# Patient Record
Sex: FEMALE | Race: WHITE | Employment: FULL TIME | URBAN - METROPOLITAN AREA
[De-identification: names, ages, dates, MRNs, and addresses within clinical notes are randomized per-mention and may not be internally consistent; named-entity substitution may affect disease eponyms.]

---

## 2017-02-08 PROBLEM — J34.89 NASAL SEPTAL PERFORATION: Status: ACTIVE | Noted: 2017-02-08

## 2017-02-08 PROBLEM — J32.0 CHRONIC MAXILLARY SINUSITIS: Status: ACTIVE | Noted: 2017-02-08

## 2017-02-08 PROBLEM — R49.0 HOARSENESS: Status: ACTIVE | Noted: 2017-02-08

## 2017-02-08 PROBLEM — R09.82 POST-NASAL DRIP: Status: ACTIVE | Noted: 2017-02-08

## 2017-04-05 ENCOUNTER — HOSPITAL ENCOUNTER (OUTPATIENT)
Dept: ULTRASOUND IMAGING | Facility: HOSPITAL | Age: 37
Discharge: HOME | End: 2017-04-05
Attending: OTOLARYNGOLOGY
Payer: MEDICAID

## 2017-04-05 DIAGNOSIS — E04.1 THYROID NODULE: ICD-10-CM

## 2017-04-05 PROCEDURE — 76942 ECHO GUIDE FOR BIOPSY: CPT

## 2017-04-05 PROCEDURE — 10022 US FNA THYROID SH(CPT=10022/76942): CPT

## 2017-04-05 NOTE — PROCEDURES
INDICATIONS:  E04.1 Nontoxic single thyroid nodule      FINDINGS:         Informed consent was obtained. 1% lidocaine was given as local anesthetic. The patient was severely anxious and was unable to hold still.  The patient brought her hands to the sterile

## 2017-04-28 PROBLEM — E03.9 ACQUIRED HYPOTHYROIDISM: Status: ACTIVE | Noted: 2017-04-28

## 2017-09-19 ENCOUNTER — LAB ENCOUNTER (OUTPATIENT)
Dept: LAB | Age: 37
End: 2017-09-19
Attending: OTOLARYNGOLOGY
Payer: MEDICAID

## 2017-09-19 DIAGNOSIS — E04.1 THYROID NODULE: ICD-10-CM

## 2017-09-19 LAB
FREE T4: 0.9 NG/DL (ref 0.9–1.8)
TSI SER-ACNC: 3.6 MIU/ML (ref 0.35–5.5)

## 2017-09-19 PROCEDURE — 84443 ASSAY THYROID STIM HORMONE: CPT

## 2017-09-19 PROCEDURE — 84439 ASSAY OF FREE THYROXINE: CPT

## 2017-09-19 PROCEDURE — 36415 COLL VENOUS BLD VENIPUNCTURE: CPT

## 2017-09-20 NOTE — PROGRESS NOTES
Please let Nick Andrade know that her TSH is on the higher side of normal, suggesting that her thyroid is normal to low-functioning. Did she feel better on 25 mcg levothyroxine previously?  Please clarify any fatigue, cold intolerance, weight change (I know she

## 2019-01-24 ENCOUNTER — IMAGING SERVICES (OUTPATIENT)
Dept: OTHER | Age: 39
End: 2019-01-24

## 2022-11-16 NOTE — PROGRESS NOTES
Phone call with patient. Patient verbalized understanding of all results and recommendations. See phone note 9/20/17. Gabapentin Counseling: I discussed with the patient the risks of gabapentin including but not limited to dizziness, somnolence, fatigue and ataxia.